# Patient Record
Sex: MALE | Race: WHITE | NOT HISPANIC OR LATINO | Employment: STUDENT | URBAN - METROPOLITAN AREA
[De-identification: names, ages, dates, MRNs, and addresses within clinical notes are randomized per-mention and may not be internally consistent; named-entity substitution may affect disease eponyms.]

---

## 2019-10-29 ENCOUNTER — OFFICE VISIT (OUTPATIENT)
Dept: URGENT CARE | Facility: CLINIC | Age: 19
End: 2019-10-29
Payer: COMMERCIAL

## 2019-10-29 VITALS
HEIGHT: 71 IN | SYSTOLIC BLOOD PRESSURE: 128 MMHG | RESPIRATION RATE: 18 BRPM | OXYGEN SATURATION: 97 % | HEART RATE: 53 BPM | WEIGHT: 144.6 LBS | TEMPERATURE: 97.1 F | DIASTOLIC BLOOD PRESSURE: 70 MMHG | BODY MASS INDEX: 20.24 KG/M2

## 2019-10-29 DIAGNOSIS — B96.89 ACUTE BACTERIAL SINUSITIS: Primary | ICD-10-CM

## 2019-10-29 DIAGNOSIS — J02.9 SORE THROAT: ICD-10-CM

## 2019-10-29 DIAGNOSIS — J01.90 ACUTE BACTERIAL SINUSITIS: Primary | ICD-10-CM

## 2019-10-29 LAB — S PYO AG THROAT QL: NEGATIVE

## 2019-10-29 PROCEDURE — G0382 LEV 3 HOSP TYPE B ED VISIT: HCPCS | Performed by: FAMILY MEDICINE

## 2019-10-29 PROCEDURE — 87070 CULTURE OTHR SPECIMN AEROBIC: CPT | Performed by: FAMILY MEDICINE

## 2019-10-29 PROCEDURE — 87147 CULTURE TYPE IMMUNOLOGIC: CPT | Performed by: FAMILY MEDICINE

## 2019-10-29 RX ORDER — LORATADINE 10 MG/1
10 TABLET ORAL DAILY
COMMUNITY

## 2019-10-29 RX ORDER — ALBUTEROL SULFATE 90 UG/1
2 AEROSOL, METERED RESPIRATORY (INHALATION) EVERY 6 HOURS PRN
COMMUNITY

## 2019-10-29 RX ORDER — DILTIAZEM HYDROCHLORIDE 60 MG/1
2 TABLET, FILM COATED ORAL 2 TIMES DAILY
COMMUNITY
Start: 2019-10-04

## 2019-10-29 RX ORDER — MOMETASONE FUROATE 50 UG/1
1 SPRAY, METERED NASAL 2 TIMES DAILY
COMMUNITY

## 2019-10-29 RX ORDER — PREDNISONE 10 MG/1
TABLET ORAL
Qty: 21 TABLET | Refills: 0 | Status: SHIPPED | OUTPATIENT
Start: 2019-10-29

## 2019-10-29 RX ORDER — AMOXICILLIN AND CLAVULANATE POTASSIUM 875; 125 MG/1; MG/1
1 TABLET, FILM COATED ORAL EVERY 12 HOURS SCHEDULED
Qty: 42 TABLET | Refills: 0 | Status: SHIPPED | OUTPATIENT
Start: 2019-10-29 | End: 2019-11-19

## 2019-10-29 RX ORDER — MONTELUKAST SODIUM 10 MG/1
10 TABLET ORAL DAILY
COMMUNITY
Start: 2019-10-21

## 2019-10-29 NOTE — PROGRESS NOTES
[address] [facility]        NAME: Ike Hilario is a 22 y o  male  : 2000    MRN: [id number]  DATE: 2019  TIME: 3:32 PM    Assessment and Plan   Acute bacterial sinusitis [J01 90, B96 89]  1  Acute bacterial sinusitis  amoxicillin-clavulanate (AUGMENTIN) 875-125 mg per tablet    predniSONE 10 mg tablet   2  Sore throat  POCT rapid strepA    Throat culture         Patient Instructions   Patient Instructions   Augmentin as directed  Prednisone taper as directed  Increase fluid intake  Follow-up with PCP or [facility] if symptoms do not improve in 5 days        Proceed to  ER if symptoms worsen  Chief Complaint     Chief Complaint   Patient presents with    Sore Throat     c/o sore throat, on/off for about a week  States got intense last night and hasn't gone away since   Generalized Body Aches     c/o body aches, felt like he was hit by a train this morning when he awoke  Has been taking ibuprofen/tylenol for body aches   Headache    Fever     c/o fever last night, temp of 100 degree  Took tylenol for fever   Dizziness     c/o dizziness, started approx  2 weeks ago  Denies any blurry vision or passing out feeling  History of Present Illness       Patient presents with 2 week history of intermittent dizziness with sinus congestion, for the past week he has had body aches, sore throat and mild ear discomfort  He continues with frontal sinus pressure and headache, he denies cough chest tightness shortness of breath or wheezing  Patient states he has an immune disorder where if he needs antibiotics it was recommended by his immunologist that he receive them for 21 days  He is unclear with a diagnosis specifically is firs immunologic disorder, he follows with an immunologist in [facility]  He is currently a student at [employer]  Patient denies fevers or chills  Patient notes symptoms are progressively worsening        Review of Systems   Review of Systems Constitutional: Positive for fatigue  HENT: Positive for congestion, sinus pressure, sinus pain and sore throat  Respiratory: Negative  Cardiovascular: Negative  Neurological: Positive for dizziness and headaches  Current Medications       Current Outpatient Medications:     albuterol (PROVENTIL HFA,VENTOLIN HFA) 90 mcg/act inhaler, Inhale 2 puffs every 6 (six) hours as needed for wheezing, Disp: , Rfl:     loratadine (CLARITIN) 10 mg tablet, Take 10 mg by mouth daily, Disp: , Rfl:     mometasone (NASONEX) 50 mcg/act nasal spray, 1 spray into each nostril 2 (two) times a day, Disp: , Rfl:     montelukast (SINGULAIR) 10 mg tablet, Take 10 mg by mouth daily , Disp: , Rfl:     SYMBICORT 80-4 5 MCG/ACT inhaler, Inhale 2 puffs 2 (two) times a day , Disp: , Rfl:     amoxicillin-clavulanate (AUGMENTIN) 875-125 mg per tablet, Take 1 tablet by mouth every 12 (twelve) hours for 21 days, Disp: 42 tablet, Rfl: 0    predniSONE 10 mg tablet, Take 6 tablets today, 5 tomorrow, 4 the next day, 3 the next day, 2 the following and 1 the last day with food, Disp: 21 tablet, Rfl: 0    Current Allergies     Allergies as of 10/29/2019    (No Known Allergies)            The following portions of the patient's history were reviewed and updated as appropriate: allergies, current medications, past family history, past medical history, past social history, past surgical history and problem list      Past Medical History:   Diagnosis Date    Abnormality of immune system     Asthma        Past Surgical History:   Procedure Laterality Date    EYE SURGERY Right     done at age 8       No family history on file  Medications have been verified          Objective   /70 (BP Location: Left arm, Patient Position: Sitting, Cuff Size: Standard)   Pulse (!) 53   Temp (!) 97 1 °F (36 2 °C) (Tympanic)   Resp 18   Ht 5' 11" (1 803 m)   Wt 65 6 kg (144 lb 9 6 oz)   SpO2 97%   BMI 20 17 kg/m²        Physical Exam Physical Exam   Constitutional: He appears well-developed and well-nourished  He does not appear ill  HENT:   Right Ear: Tympanic membrane and ear canal normal    Left Ear: Tympanic membrane and ear canal normal    Mouth/Throat: Oropharynx is clear and moist    Intranasal mucosal erythema edema and mucopurulent rhinorrhea, TM clear bilaterally, pharynx without exudates, mild posterior erythema   Neck: Neck supple  Cardiovascular: Normal rate and regular rhythm  Pulmonary/Chest: Effort normal and breath sounds normal    Abdominal: Soft  Lymphadenopathy:     He has no cervical adenopathy

## 2019-10-29 NOTE — PATIENT INSTRUCTIONS
Augmentin as directed  Prednisone taper as directed  Increase fluid intake  Follow-up with PCP or [facility] if symptoms do not improve in 5 days

## 2019-10-30 ENCOUNTER — TELEPHONE (OUTPATIENT)
Dept: URGENT CARE | Facility: CLINIC | Age: 19
End: 2019-10-30

## 2019-10-31 LAB — BACTERIA THROAT CULT: ABNORMAL

## 2019-11-04 ENCOUNTER — OFFICE VISIT (OUTPATIENT)
Dept: URGENT CARE | Facility: CLINIC | Age: 19
End: 2019-11-04
Payer: COMMERCIAL

## 2019-11-04 VITALS
RESPIRATION RATE: 18 BRPM | DIASTOLIC BLOOD PRESSURE: 66 MMHG | WEIGHT: 145.6 LBS | BODY MASS INDEX: 20.38 KG/M2 | HEART RATE: 64 BPM | SYSTOLIC BLOOD PRESSURE: 128 MMHG | OXYGEN SATURATION: 97 % | HEIGHT: 71 IN | TEMPERATURE: 98.8 F

## 2019-11-04 DIAGNOSIS — B96.89 ACUTE BACTERIAL SINUSITIS: Primary | ICD-10-CM

## 2019-11-04 DIAGNOSIS — J01.90 ACUTE BACTERIAL SINUSITIS: Primary | ICD-10-CM

## 2019-11-04 PROCEDURE — G0382 LEV 3 HOSP TYPE B ED VISIT: HCPCS | Performed by: FAMILY MEDICINE

## 2019-11-04 RX ORDER — FLUTICASONE PROPIONATE 50 MCG
2 SPRAY, SUSPENSION (ML) NASAL DAILY
Qty: 16 G | Refills: 0 | Status: SHIPPED | OUTPATIENT
Start: 2019-11-04 | End: 2019-11-11

## 2019-11-04 NOTE — LETTER
November 4, 2019     Patient: Ike Hilario   YOB: 2000   Date of Visit: 11/4/2019       To Whom it May Concern:    Ike Hilario was seen in my clinic on 11/4/2019  Patient is medically cleared to return to classes    If you have any questions or concerns, please don't hesitate to call           Sincerely,          Kevin Berg DO        CC: No Recipients

## 2019-11-04 NOTE — PATIENT INSTRUCTIONS
Patient medically cleared to return to classes  Flonase nasal spray as directed for postnasal drip likely inducing cough  Over-the-counter Delsym or Mucinex  Continue inhalers as needed for asthma symptoms    Follow-up with [facility] or PCP if needed

## 2019-11-04 NOTE — PROGRESS NOTES
[address] [facility]        NAME: Susanna Yusuf is a 22 y o  male  : 2000    MRN: [id number]  DATE: 2019  TIME: 2:01 PM    Assessment and Plan   Acute bacterial sinusitis [J01 90, B96 89]  1  Acute bacterial sinusitis  fluticasone (FLONASE) 50 mcg/act nasal spray         Patient Instructions   Patient Instructions   Patient medically cleared to return to classes  Flonase nasal spray as directed for postnasal drip likely inducing cough  Over-the-counter Delsym or Mucinex  Continue inhalers as needed for asthma symptoms  Follow-up with [facility] or PCP if needed        Proceed to  ER if symptoms worsen  Chief Complaint     Chief Complaint   Patient presents with    Cough     using albuterol neb q4hr round-the-clock now (vs prn); Pt sx began 1 week ago  States requires a return to school note for [employer], "due to compromised immune system, campus disability services were involved "          History of Present Illness       Patient was seen by me 6 days ago and diagnosed with acute bacterial sinusitis and provided a prescription for Augmentin and prednisone  Patient has an unknown type of immune deficiency disorder that he is unclear as to the actual diagnosis  He was provided antibiotics for 21 days regarding this immune disorder that he tells me he normally and requires 21 days of antibiotics per his immunologist   I was not able to verify any of this as he lives in [place]  He was provided a note excusing him for classes for 2 days, patient chose to stay out for 1 week  He presents today stating he has symptomatic improvement with occasional cough but otherwise he feels well and feels ready to return to classes on requires a note indicating that he is medically cleared to do so  No sinus congestion or pressure no ear pain no sore throat no chest tightness shortness of breath or wheezing, he does have a persistent dry cough  He did complete all the prednisone    He does have a history of allergies and asthma  Review of Systems   Review of Systems   Constitutional: Negative  HENT: Negative  Eyes: Negative  Respiratory: Positive for cough  Negative for chest tightness, shortness of breath and wheezing  Cardiovascular: Negative  Current Medications       Current Outpatient Medications:     amoxicillin-clavulanate (AUGMENTIN) 875-125 mg per tablet, Take 1 tablet by mouth every 12 (twelve) hours for 21 days, Disp: 42 tablet, Rfl: 0    albuterol (PROVENTIL HFA,VENTOLIN HFA) 90 mcg/act inhaler, Inhale 2 puffs every 6 (six) hours as needed for wheezing, Disp: , Rfl:     fluticasone (FLONASE) 50 mcg/act nasal spray, 2 sprays into each nostril daily for 7 days, Disp: 16 g, Rfl: 0    loratadine (CLARITIN) 10 mg tablet, Take 10 mg by mouth daily, Disp: , Rfl:     mometasone (NASONEX) 50 mcg/act nasal spray, 1 spray into each nostril 2 (two) times a day, Disp: , Rfl:     montelukast (SINGULAIR) 10 mg tablet, Take 10 mg by mouth daily , Disp: , Rfl:     predniSONE 10 mg tablet, Take 6 tablets today, 5 tomorrow, 4 the next day, 3 the next day, 2 the following and 1 the last day with food (Patient not taking: Reported on 11/4/2019), Disp: 21 tablet, Rfl: 0    SYMBICORT 80-4 5 MCG/ACT inhaler, Inhale 2 puffs 2 (two) times a day , Disp: , Rfl:     Current Allergies     Allergies as of 11/04/2019    (No Known Allergies)            The following portions of the patient's history were reviewed and updated as appropriate: allergies, current medications, past family history, past medical history, past social history, past surgical history and problem list      Past Medical History:   Diagnosis Date    Abnormality of immune system     Asthma        Past Surgical History:   Procedure Laterality Date    EYE SURGERY Right     done at age 8       No family history on file  Medications have been verified          Objective   /66   Pulse 64   Temp 98 8 °F (37 1 °C) Resp 18   Ht 5' 11" (1 803 m)   Wt 66 kg (145 lb 9 6 oz)   SpO2 97%   BMI 20 31 kg/m²        Physical Exam     Physical Exam   Constitutional: He appears well-developed and well-nourished  No distress  HENT:   Right Ear: External ear normal    Left Ear: External ear normal    Mouth/Throat: No oropharyngeal exudate  Mild intranasal mucosal erythema edema and clear rhinorrhea, PND   Eyes: Conjunctivae are normal    Neck: Neck supple  Cardiovascular: Normal rate, regular rhythm and normal heart sounds  Pulmonary/Chest: Effort normal and breath sounds normal    Abdominal: Soft  Lymphadenopathy:     He has no cervical adenopathy

## 2022-09-17 ENCOUNTER — HOSPITAL ENCOUNTER (OUTPATIENT)
Facility: CLINIC | Age: 22
Discharge: HOME | End: 2022-09-17
Attending: FAMILY MEDICINE
Payer: COMMERCIAL

## 2022-09-17 VITALS
HEART RATE: 73 BPM | SYSTOLIC BLOOD PRESSURE: 123 MMHG | TEMPERATURE: 98.9 F | OXYGEN SATURATION: 97 % | RESPIRATION RATE: 16 BRPM | DIASTOLIC BLOOD PRESSURE: 74 MMHG

## 2022-09-17 DIAGNOSIS — J06.9 VIRAL URI: Primary | ICD-10-CM

## 2022-09-17 LAB
EXPIRATION DATE: NORMAL
Lab: NORMAL
POCT MANUFACTURER: NORMAL
S PYO AG THROAT QL: NEGATIVE
SARS-COV-2 RNA RESP QL NAA+PROBE: POSITIVE

## 2022-09-17 PROCEDURE — 87880 STREP A ASSAY W/OPTIC: CPT | Mod: QW | Performed by: NURSE PRACTITIONER

## 2022-09-17 PROCEDURE — U0003 INFECTIOUS AGENT DETECTION BY NUCLEIC ACID (DNA OR RNA); SEVERE ACUTE RESPIRATORY SYNDROME CORONAVIRUS 2 (SARS-COV-2) (CORONAVIRUS DISEASE [COVID-19]), AMPLIFIED PROBE TECHNIQUE, MAKING USE OF HIGH THROUGHPUT TECHNOLOGIES AS DESCRIBED BY CMS-2020-01-R: HCPCS | Performed by: NURSE PRACTITIONER

## 2022-09-17 PROCEDURE — 87081 CULTURE SCREEN ONLY: CPT | Performed by: NURSE PRACTITIONER

## 2022-09-17 PROCEDURE — 99203 OFFICE O/P NEW LOW 30 MIN: CPT | Performed by: NURSE PRACTITIONER

## 2022-09-17 RX ORDER — MONTELUKAST SODIUM 10 MG/1
1 TABLET ORAL
COMMUNITY
Start: 2022-04-19

## 2022-09-17 RX ORDER — ALBUTEROL SULFATE 90 UG/1
2 INHALANT RESPIRATORY (INHALATION) EVERY 4 HOURS PRN
COMMUNITY
Start: 2022-06-27 | End: 2022-09-28 | Stop reason: SDUPTHER

## 2022-09-17 RX ORDER — BUDESONIDE AND FORMOTEROL FUMARATE DIHYDRATE 160; 4.5 UG/1; UG/1
2 AEROSOL RESPIRATORY (INHALATION) 2 TIMES DAILY
COMMUNITY
Start: 2022-01-10

## 2022-09-17 ASSESSMENT — ENCOUNTER SYMPTOMS
HEADACHES: 0
DIARRHEA: 0
SORE THROAT: 1
COUGH: 0
SINUS PRESSURE: 1
WHEEZING: 0
ABDOMINAL PAIN: 0
SHORTNESS OF BREATH: 0
NAUSEA: 0
VOMITING: 0
FATIGUE: 0
CHILLS: 0
CHEST TIGHTNESS: 0
SINUS PAIN: 0
MYALGIAS: 0
RHINORRHEA: 0
FEVER: 0

## 2022-09-17 NOTE — ED PROVIDER NOTES
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 Associated symptoms include PND, sinus pressure, and nasal congestion. At home COVID test negative. NOT COVID-vaccinated. Admits to sick contacts - unsure what illness.             Patient History   PAST HISTORY     Reviewed from Nursing Triage:         No past medical history on file.    No past surgical history on file.    No family history on file.    Social History     Tobacco Use    Smoking status: Never Smoker    Smokeless tobacco: Never Used   Vaping Use    Vaping Use: Never used   Substance Use Topics    Alcohol use: Never    Drug use: Never         Review of Systems   REVIEW OF SYSTEMS     Review of Systems   Constitutional: Negative for chills, fatigue and fever.   HENT: Positive for congestion, postnasal drip, sinus pressure and sore throat. Negative for ear pain, rhinorrhea, sinus pain and sneezing.    Respiratory: Negative for cough, chest tightness, shortness of breath and wheezing.    Cardiovascular: Negative for chest pain.   Gastrointestinal: Negative for abdominal pain, diarrhea, nausea and vomiting.   Musculoskeletal: Negative for myalgias.   Skin: Negative for rash.   Neurological: Negative for headaches.         VITALS     ED Vitals    Date/Time Temp Pulse Resp BP SpO2 [facility]   09/17/22 1243 37.2 °C (98.9 °F) 73 16 123/74 97 % ZEESHAN                       Physical Exam   PHYSICAL EXAM     Physical Exam  Vitals reviewed.   Constitutional:       General: He is awake. He is not in acute distress.     Appearance: Normal appearance. He is well-developed, well-groomed and normal weight. He is not ill-appearing, toxic-appearing or diaphoretic.   HENT:      Right Ear: Tympanic membrane and ear canal normal. No tenderness.      Left Ear: Tympanic membrane and ear canal normal. No tenderness.      Nose: Nose normal. No mucosal edema or rhinorrhea.      Mouth/Throat:       Lips: Pink.      Mouth: Mucous membranes are moist.      Pharynx: Oropharynx is clear. Uvula midline. Posterior oropharyngeal erythema present. No pharyngeal swelling, oropharyngeal exudate or uvula swelling.      Tonsils: No tonsillar exudate.   Cardiovascular:      Rate and Rhythm: Normal rate and regular rhythm.      Heart sounds: Normal heart sounds.   Pulmonary:      Effort: Pulmonary effort is normal.      Breath sounds: Normal breath sounds. No wheezing.   Musculoskeletal:      Cervical back: Normal range of motion.   Lymphadenopathy:      Head:      Right side of head: No tonsillar adenopathy.      Left side of head: No tonsillar adenopathy.      Cervical: No cervical adenopathy.   Neurological:      Mental Status: He is alert and oriented to person, place, and time.   Psychiatric:         Behavior: Behavior is cooperative.           PROCEDURES     Procedures     DATA     Results     None              No orders to display       Scoring tools                                  ED Course & MDM   MDM / ED COURSE / CLINICAL IMPRESSION / DISPO     MDM  Number of Diagnoses or Management Options  Viral URI  Diagnosis management comments:     22 y/o female presents with URI sx for 1 day  Well-appearing in NAD. Vitals are normal.  He has mild OP erythema. No exudate, tonsillar enlargement or lymphadenopathy.  Rapid strep negative  Will send throat culture and COVID.   Suspect viral etiology and recommend symptomatic treatment for now: saline, flonase, anti-histamine, OTC analgesics.   Strict return precautions given. Patient was given opportunity to ask questions. Patient verbalized understanding and agreeable with plan. See dispo for full care plan.           Clinical Impression      None     Disposition           Crissy Currie CRNP  09/17/22 1645

## 2022-09-17 NOTE — ED ATTESTATION NOTE
I was immediately available to provide supervision and direction for the care of the patient.    Crys Martin DO  09/17/22 1550

## 2022-09-17 NOTE — DISCHARGE INSTRUCTIONS
Rapid strep was negative.   We will send a throat culture and COVID test to the lab  Results will [facility]  We will call you if results are positive.    I suspect that your symptoms are due to a virus and recommended that following:    Saline nasal spray and/or neti pot several times per day for sinus symptoms. Blow your nose well after use.  After saline, use Flonase: 1 spray in each nostril once daily.  Stay well hydrated; Drink enough fluids so that your urine is pale yellow.   Decongestant (Sudafed), as directed.   Take Acetaminophen or Ibuprofen as needed for pain    If symptoms worsen or do not improve in 1 week, follow-up with your primary care doctor.  If you develop chest pain, severe shortness of breath or difficulty breathing, report to the emergency room.    Thank you for choosing [facility]!   Have You Had A Chemical Peel Before?: has had a previous peel When Outside In The Sun, Do You...: mostly tans, rarely burns When Was Your Last Peel?: 01/25/2018

## 2022-09-19 LAB — B-HEM STREP SPEC QL CULT: NORMAL

## 2022-09-28 ENCOUNTER — HOSPITAL ENCOUNTER (OUTPATIENT)
Facility: CLINIC | Age: 22
Discharge: HOME | End: 2022-09-28
Attending: FAMILY MEDICINE
Payer: COMMERCIAL

## 2022-09-28 VITALS
TEMPERATURE: 97.9 F | OXYGEN SATURATION: 98 % | RESPIRATION RATE: 18 BRPM | SYSTOLIC BLOOD PRESSURE: 132 MMHG | DIASTOLIC BLOOD PRESSURE: 68 MMHG | HEART RATE: 50 BPM

## 2022-09-28 DIAGNOSIS — R05.9 COUGH: ICD-10-CM

## 2022-09-28 DIAGNOSIS — J01.90 ACUTE SINUSITIS, RECURRENCE NOT SPECIFIED, UNSPECIFIED LOCATION: ICD-10-CM

## 2022-09-28 DIAGNOSIS — U09.9 POST-COVID-19 CONDITION: Primary | ICD-10-CM

## 2022-09-28 LAB
EXPIRATION DATE: NORMAL
Lab: NORMAL
POCT MANUFACTURER: NORMAL
RAPID INFLUENZA A AGN: NEGATIVE
RAPID INFLUENZA B AGN: NEGATIVE

## 2022-09-28 PROCEDURE — 99213 OFFICE O/P EST LOW 20 MIN: CPT | Performed by: FAMILY MEDICINE

## 2022-09-28 PROCEDURE — 87804 INFLUENZA ASSAY W/OPTIC: CPT | Mod: QW | Performed by: FAMILY MEDICINE

## 2022-09-28 RX ORDER — ALBUTEROL SULFATE 90 UG/1
2 INHALANT RESPIRATORY (INHALATION) EVERY 4 HOURS PRN
Qty: 18 G | Refills: 0 | Status: SHIPPED | OUTPATIENT
Start: 2022-09-28

## 2022-09-28 RX ORDER — AZITHROMYCIN 250 MG/1
250 TABLET, FILM COATED ORAL DAILY
Qty: 6 TABLET | Refills: 0 | Status: SHIPPED | OUTPATIENT
Start: 2022-09-28 | End: 2022-10-03

## 2022-09-28 ASSESSMENT — ENCOUNTER SYMPTOMS
COUGH: 1
SINUS PAIN: 0
VOMITING: 0
MYALGIAS: 0
DIZZINESS: 0
FATIGUE: 1
SINUS PRESSURE: 1
FEVER: 1
PALPITATIONS: 0
NUMBNESS: 0
CHEST TIGHTNESS: 0
RHINORRHEA: 1
ABDOMINAL PAIN: 0
CHILLS: 1
SHORTNESS OF BREATH: 0
NAUSEA: 0
SORE THROAT: 0

## 2022-09-28 NOTE — ED PROVIDER NOTES
History  Chief Complaint   Patient presents with    Cough     Had covid 9/17  now coughing and mucus(yellow) fever chills     Chai is a 22 yo male with a past history of IgA def, presenting with cold symptoms for  11 days, dx'ed with COVID.   He states he was feeling better, but now C/o cough, congestion, and subjective fever.   Denies difficulty breathing, loss of taste or smell, N/V/D.         History provided by:  Patient   used: No    Cough  Associated symptoms: chills, fever and rhinorrhea    Associated symptoms: no chest pain, no myalgias, no shortness of breath and no sore throat        No past medical history on file.    No past surgical history on file.    No family history on file.    Social History     Tobacco Use    Smoking status: Never Smoker    Smokeless tobacco: Never Used   Vaping Use    Vaping Use: Never used   Substance Use Topics    Alcohol use: Never    Drug use: Never       Review of Systems   Constitutional: Positive for chills, fatigue and fever.   HENT: Positive for congestion, postnasal drip, rhinorrhea and sinus pressure. Negative for sinus pain, sneezing and sore throat.    Respiratory: Positive for cough. Negative for chest tightness and shortness of breath.    Cardiovascular: Negative for chest pain and palpitations.   Gastrointestinal: Negative for abdominal pain, nausea and vomiting.   Musculoskeletal: Negative for myalgias.   Neurological: Negative for dizziness and numbness.       Physical Exam  ED Triage Vitals [09/28/22 1014]   Temp Heart Rate Resp BP SpO2   36.6 °C (97.9 °F) (!) 50 18 132/68 98 %      Temp Source Heart Rate Source Patient Position BP Location FiO2 (%) (Set)   Temporal Monitor Sitting Left upper arm --       Physical Exam  Constitutional:       General: He is not in acute distress.     Appearance: Normal appearance. He is not ill-appearing.   HENT:      Head: Normocephalic.      Nose: Congestion and rhinorrhea present.       Mouth/Throat:      Mouth: Mucous membranes are moist.      Pharynx: No oropharyngeal exudate or posterior oropharyngeal erythema.      Comments: PND    Eyes:      Extraocular Movements: Extraocular movements intact.      Conjunctiva/sclera: Conjunctivae normal.   Cardiovascular:      Rate and Rhythm: Normal rate and regular rhythm.      Pulses: Normal pulses.      Heart sounds: Normal heart sounds. No murmur heard.    No friction rub. No gallop.   Pulmonary:      Effort: Pulmonary effort is normal. No respiratory distress.      Breath sounds: No wheezing, rhonchi or rales.   Musculoskeletal:         General: Normal range of motion.      Cervical back: Normal range of motion.   Lymphadenopathy:      Cervical: No cervical adenopathy.   Skin:     General: Skin is warm.      Capillary Refill: Capillary refill takes less than 2 seconds.   Neurological:      General: No focal deficit present.      Mental Status: He is alert and oriented to person, place, and time.           Procedures  Procedures    UC Course       MDM  Number of Diagnoses or Management Options  Acute sinusitis, recurrence not specified, unspecified location  Cough  Post-COVID-19 condition  Diagnosis management comments: Exam shows PND, boggy turbinates and congestion. Lungs-CTAB, no w/r/r, CV-No m/r/g, RRR. Otherwise unremarkable.   URI, likely viral, low suspicion/concern for COVID, declined testing at this time.   Helpful URI recommendations and tips provided on discharge.   Recommending Nasal saline, Salt Water gargle  Urged to seek immediate medical treatment should symptoms worsen.    Oral Antibiotic prescription printed as prn. The patient verbally understands the signs & symptoms to monitor to warrant starting oral antibiotic.   Recommending a daily yogurt or Probiotic for GI health while taking antibiotics.  To take the Probiotic *no sooner* than 4 hours *after* taking the antibiotic to prevent the good bacteria from being killed by the  antibiotic.       Risk of Complications, Morbidity, and/or Mortality  Presenting problems: low  Diagnostic procedures: moderate  Management options: low    Critical Care  Total time providing critical care: < 30 minutes    Patient Progress  Patient progress: stable                 Amandeep Stevenson DO  09/28/22 1054

## 2022-09-28 NOTE — DISCHARGE INSTRUCTIONS
Please be aware, start to finish, a viral illness can last 10-14 days. If your symptoms are not improving in this time, please return for further evaluation. And remember, antibiotics do not treat viruses.     **REMEMBER** Treat the PROBLEM (mucous) and your SYMPTOMS (cough, sore throat, congestion) will improve! The best remedy for mucous isnt OTC medications, its saline (salt water; gargling AND sinus rinsing)!  Whatever mucous you rinse out early in your illness will decrease the chance of persistent lingering cough!    I personally recommend [employer] All-In-One (find this on [employer] or at your local pharmacy), every 3-4hrs, 30 mins before bed and first thing in the AM. If you choose to use a nasal rinse that you must prepare, please only use bottled/distilled water.     Salt Water gargles- as often as needed throughout the day.     Proper hand hygiene.     You may also use Delsym cough medicine (for patients 4yrs and older)  and Mucinex 12hr (Drink Plenty of water to active it!). These will  help break up that mucous and help you to get it out!    Oil Diffuser with essential oils such as lavender, camphor, eucalyptus, theives. Thieves oil is very helpful! A few drops in an oil diffuser can provide significant relief!     __________________________________________________________     You have been prescribed an antibiotic, only fill and take if symptoms progress/worsen.     Once started, please complete full course of antibiotic to prevent creating resistance within the bugs causing your illness.       Please eat a daily yogurt or take a daily Probiotic for GI health while taking antibiotics.  Recommending the probiotic Digestive Advantage (safe for patients 3yrs and older).    Remember to take the Probiotic *no sooner* than 4 hours *after* taking your antibiotic.  If you take the probiotic sooner than that, the good bacteria will likely be killed by the antibiotic.    __________________________________________________________    Please let us know about your experience today!   Your input is truly appreciated and helps Dr. Stevenson and your team at [facility] to continue to provide a superior level of service!   Get Well Soon!